# Patient Record
Sex: FEMALE | Race: BLACK OR AFRICAN AMERICAN | NOT HISPANIC OR LATINO | URBAN - METROPOLITAN AREA
[De-identification: names, ages, dates, MRNs, and addresses within clinical notes are randomized per-mention and may not be internally consistent; named-entity substitution may affect disease eponyms.]

---

## 2017-12-14 ENCOUNTER — EMERGENCY (EMERGENCY)
Facility: HOSPITAL | Age: 57
LOS: 1 days | Discharge: HOME | End: 2017-12-14
Attending: EMERGENCY MEDICINE
Payer: COMMERCIAL

## 2017-12-14 VITALS
OXYGEN SATURATION: 97 % | TEMPERATURE: 99 F | DIASTOLIC BLOOD PRESSURE: 70 MMHG | RESPIRATION RATE: 15 BRPM | HEART RATE: 72 BPM | SYSTOLIC BLOOD PRESSURE: 135 MMHG

## 2017-12-14 VITALS
DIASTOLIC BLOOD PRESSURE: 72 MMHG | OXYGEN SATURATION: 98 % | RESPIRATION RATE: 16 BRPM | HEIGHT: 60 IN | WEIGHT: 147.93 LBS | TEMPERATURE: 99 F | HEART RATE: 84 BPM | SYSTOLIC BLOOD PRESSURE: 145 MMHG

## 2017-12-14 LAB
ALBUMIN SERPL ELPH-MCNC: 4.3 G/DL — SIGNIFICANT CHANGE UP (ref 3.3–5.2)
ALP SERPL-CCNC: 71 U/L — SIGNIFICANT CHANGE UP (ref 40–120)
ALT FLD-CCNC: 32 U/L — SIGNIFICANT CHANGE UP
ANION GAP SERPL CALC-SCNC: 17 MMOL/L — SIGNIFICANT CHANGE UP (ref 5–17)
APTT BLD: 30.3 SEC — SIGNIFICANT CHANGE UP (ref 27.5–37.4)
AST SERPL-CCNC: 22 U/L — SIGNIFICANT CHANGE UP
BILIRUB SERPL-MCNC: 0.3 MG/DL — LOW (ref 0.4–2)
BUN SERPL-MCNC: 29 MG/DL — HIGH (ref 8–20)
CALCIUM SERPL-MCNC: 9.7 MG/DL — SIGNIFICANT CHANGE UP (ref 8.6–10.2)
CHLORIDE SERPL-SCNC: 97 MMOL/L — LOW (ref 98–107)
CO2 SERPL-SCNC: 24 MMOL/L — SIGNIFICANT CHANGE UP (ref 22–29)
CREAT SERPL-MCNC: 1.58 MG/DL — HIGH (ref 0.5–1.3)
GLUCOSE SERPL-MCNC: 263 MG/DL — HIGH (ref 70–115)
HCT VFR BLD CALC: 28.4 % — LOW (ref 37–47)
HGB BLD-MCNC: 9.3 G/DL — LOW (ref 12–16)
INR BLD: 0.99 RATIO — SIGNIFICANT CHANGE UP (ref 0.88–1.16)
MCHC RBC-ENTMCNC: 26.6 PG — LOW (ref 27–31)
MCHC RBC-ENTMCNC: 32.7 G/DL — SIGNIFICANT CHANGE UP (ref 32–36)
MCV RBC AUTO: 81.4 FL — SIGNIFICANT CHANGE UP (ref 81–99)
PLATELET # BLD AUTO: 282 K/UL — SIGNIFICANT CHANGE UP (ref 150–400)
POTASSIUM SERPL-MCNC: 3.6 MMOL/L — SIGNIFICANT CHANGE UP (ref 3.5–5.3)
POTASSIUM SERPL-SCNC: 3.6 MMOL/L — SIGNIFICANT CHANGE UP (ref 3.5–5.3)
PROT SERPL-MCNC: 8 G/DL — SIGNIFICANT CHANGE UP (ref 6.6–8.7)
PROTHROM AB SERPL-ACNC: 10.9 SEC — SIGNIFICANT CHANGE UP (ref 9.8–12.7)
RBC # BLD: 3.49 M/UL — LOW (ref 4.4–5.2)
RBC # FLD: 12.4 % — SIGNIFICANT CHANGE UP (ref 11–15.6)
SODIUM SERPL-SCNC: 138 MMOL/L — SIGNIFICANT CHANGE UP (ref 135–145)
WBC # BLD: 13 K/UL — HIGH (ref 4.8–10.8)
WBC # FLD AUTO: 13 K/UL — HIGH (ref 4.8–10.8)

## 2017-12-14 PROCEDURE — 71250 CT THORAX DX C-: CPT

## 2017-12-14 PROCEDURE — 85730 THROMBOPLASTIN TIME PARTIAL: CPT

## 2017-12-14 PROCEDURE — 99284 EMERGENCY DEPT VISIT MOD MDM: CPT | Mod: 25

## 2017-12-14 PROCEDURE — 85610 PROTHROMBIN TIME: CPT

## 2017-12-14 PROCEDURE — 71250 CT THORAX DX C-: CPT | Mod: 26

## 2017-12-14 PROCEDURE — 71010: CPT | Mod: 26

## 2017-12-14 PROCEDURE — 71045 X-RAY EXAM CHEST 1 VIEW: CPT

## 2017-12-14 PROCEDURE — 72125 CT NECK SPINE W/O DYE: CPT

## 2017-12-14 PROCEDURE — 80053 COMPREHEN METABOLIC PANEL: CPT

## 2017-12-14 PROCEDURE — 70450 CT HEAD/BRAIN W/O DYE: CPT

## 2017-12-14 PROCEDURE — 85027 COMPLETE CBC AUTOMATED: CPT

## 2017-12-14 PROCEDURE — 36415 COLL VENOUS BLD VENIPUNCTURE: CPT

## 2017-12-14 PROCEDURE — 99285 EMERGENCY DEPT VISIT HI MDM: CPT

## 2017-12-14 PROCEDURE — 70450 CT HEAD/BRAIN W/O DYE: CPT | Mod: 26

## 2017-12-14 PROCEDURE — 72125 CT NECK SPINE W/O DYE: CPT | Mod: 26

## 2017-12-14 RX ORDER — ONDANSETRON 8 MG/1
4 TABLET, FILM COATED ORAL ONCE
Qty: 0 | Refills: 0 | Status: COMPLETED | OUTPATIENT
Start: 2017-12-14 | End: 2017-12-14

## 2017-12-14 RX ORDER — ACETAMINOPHEN 500 MG
650 TABLET ORAL ONCE
Qty: 0 | Refills: 0 | Status: COMPLETED | OUTPATIENT
Start: 2017-12-14 | End: 2017-12-14

## 2017-12-14 RX ORDER — ONDANSETRON 8 MG/1
4 TABLET, FILM COATED ORAL ONCE
Qty: 0 | Refills: 0 | Status: DISCONTINUED | OUTPATIENT
Start: 2017-12-14 | End: 2017-12-14

## 2017-12-14 RX ADMIN — Medication 650 MILLIGRAM(S): at 15:00

## 2017-12-14 RX ADMIN — Medication 650 MILLIGRAM(S): at 13:55

## 2017-12-14 RX ADMIN — ONDANSETRON 4 MILLIGRAM(S): 8 TABLET, FILM COATED ORAL at 14:51

## 2017-12-14 NOTE — ED PROVIDER NOTE - PROGRESS NOTE DETAILS
pt results reviewed and she has known renal failure and anemia and she does not have any symptoms of active gall bladder disease and she will take all test results to her doctor for further outpt workup

## 2017-12-14 NOTE — ED ADULT NURSE NOTE - OBJECTIVE STATEMENT
PT came in c/o fall 2 days ago down steps at home, PT fell 2 days ago. PT denies LOC, PT c/o right sided rib pain, and generalized right sided soreness

## 2017-12-14 NOTE — ED PROVIDER NOTE - MUSCULOSKELETAL, MLM
Spine appears normal, range of motion is not limited, no muscle or joint tenderness TTP to right lateral ribs and posterior head.

## 2017-12-14 NOTE — ED PROVIDER NOTE - OBJECTIVE STATEMENT
58 y/o female with a PMHx of HTN, type 2 diabetes, HLD, kidney disease, fibroids, anemia was well until 3 days when she fell down the stairs in her home. Pt states that she hit the back of her head, her back, and her right side. Pt states that she did not experience LOC, but felt dizzy and had a HA post fall. Pt states she took 1 percocet 2 days ago and 1 percocet yesterday that she previously had prescribed for chronic back pain. Pt states that she began vomiting last night, and has not had a BM or flatus in 2 days. At this time, pt states that she feels nauseous and has pain in her right side. Pt denies HA, chest pain, SOB, cough, fever, diarrhea.

## 2017-12-14 NOTE — ED PROVIDER NOTE - CARE PLAN
Principal Discharge DX:	Multiple contusions of trunk, initial encounter  Secondary Diagnosis:	Anemia  Secondary Diagnosis:	Kidney disease

## 2017-12-14 NOTE — ED PROVIDER NOTE - MEDICAL DECISION MAKING DETAILS
58 y/o female s/p fall 3 days ago c/o right-sided rib pain and nausea. Will obtain CT head, CT C-spine, CT chest, CXR and re-eval.

## 2017-12-14 NOTE — ED ADULT TRIAGE NOTE - CHIEF COMPLAINT QUOTE
Pt BIBA, PARIS&Ox3 ambulatory in ED. Pt c/o trip and fall x3 days ago c/o right lower back pain, states " I feel like my whole body is weak." Denies LOC, head injury or blood thinners. Pt in no apparent distress, steady gait noted.

## 2019-01-20 ENCOUNTER — EMERGENCY (EMERGENCY)
Facility: HOSPITAL | Age: 59
LOS: 1 days | Discharge: HOME | End: 2019-01-20
Attending: EMERGENCY MEDICINE
Payer: MEDICARE

## 2019-01-20 VITALS
RESPIRATION RATE: 18 BRPM | HEIGHT: 60 IN | TEMPERATURE: 98 F | OXYGEN SATURATION: 97 % | DIASTOLIC BLOOD PRESSURE: 68 MMHG | SYSTOLIC BLOOD PRESSURE: 123 MMHG | HEART RATE: 79 BPM | WEIGHT: 138.01 LBS

## 2019-01-20 VITALS
HEART RATE: 71 BPM | OXYGEN SATURATION: 96 % | SYSTOLIC BLOOD PRESSURE: 119 MMHG | RESPIRATION RATE: 18 BRPM | TEMPERATURE: 98 F | DIASTOLIC BLOOD PRESSURE: 75 MMHG

## 2019-01-20 PROBLEM — N28.9 DISORDER OF KIDNEY AND URETER, UNSPECIFIED: Chronic | Status: ACTIVE | Noted: 2017-12-14

## 2019-01-20 PROBLEM — E11.9 TYPE 2 DIABETES MELLITUS WITHOUT COMPLICATIONS: Chronic | Status: ACTIVE | Noted: 2017-12-14

## 2019-01-20 PROBLEM — D64.9 ANEMIA, UNSPECIFIED: Chronic | Status: ACTIVE | Noted: 2017-12-14

## 2019-01-20 PROBLEM — D25.9 LEIOMYOMA OF UTERUS, UNSPECIFIED: Chronic | Status: ACTIVE | Noted: 2017-12-14

## 2019-01-20 PROBLEM — I10 ESSENTIAL (PRIMARY) HYPERTENSION: Chronic | Status: ACTIVE | Noted: 2017-12-14

## 2019-01-20 PROBLEM — E78.5 HYPERLIPIDEMIA, UNSPECIFIED: Chronic | Status: ACTIVE | Noted: 2017-12-14

## 2019-01-20 LAB
ALBUMIN SERPL ELPH-MCNC: 3.9 G/DL — SIGNIFICANT CHANGE UP (ref 3.3–5.2)
ALP SERPL-CCNC: 55 U/L — SIGNIFICANT CHANGE UP (ref 40–120)
ALT FLD-CCNC: 17 U/L — SIGNIFICANT CHANGE UP
ANION GAP SERPL CALC-SCNC: 14 MMOL/L — SIGNIFICANT CHANGE UP (ref 5–17)
APTT BLD: 33.6 SEC — SIGNIFICANT CHANGE UP (ref 27.5–36.3)
AST SERPL-CCNC: 16 U/L — SIGNIFICANT CHANGE UP
BASOPHILS # BLD AUTO: 0 K/UL — SIGNIFICANT CHANGE UP (ref 0–0.2)
BASOPHILS NFR BLD AUTO: 0.5 % — SIGNIFICANT CHANGE UP (ref 0–2)
BILIRUB SERPL-MCNC: 0.3 MG/DL — LOW (ref 0.4–2)
BUN SERPL-MCNC: 26 MG/DL — HIGH (ref 8–20)
CALCIUM SERPL-MCNC: 9.3 MG/DL — SIGNIFICANT CHANGE UP (ref 8.6–10.2)
CHLORIDE SERPL-SCNC: 100 MMOL/L — SIGNIFICANT CHANGE UP (ref 98–107)
CK SERPL-CCNC: 131 U/L — SIGNIFICANT CHANGE UP (ref 25–170)
CO2 SERPL-SCNC: 22 MMOL/L — SIGNIFICANT CHANGE UP (ref 22–29)
CREAT SERPL-MCNC: 1.86 MG/DL — HIGH (ref 0.5–1.3)
EOSINOPHIL # BLD AUTO: 0.2 K/UL — SIGNIFICANT CHANGE UP (ref 0–0.5)
EOSINOPHIL NFR BLD AUTO: 3.1 % — SIGNIFICANT CHANGE UP (ref 0–6)
GLUCOSE SERPL-MCNC: 204 MG/DL — HIGH (ref 70–115)
HCT VFR BLD CALC: 28.7 % — LOW (ref 37–47)
HGB BLD-MCNC: 9.2 G/DL — LOW (ref 12–16)
INR BLD: 0.96 RATIO — SIGNIFICANT CHANGE UP (ref 0.88–1.16)
LYMPHOCYTES # BLD AUTO: 2.6 K/UL — SIGNIFICANT CHANGE UP (ref 1–4.8)
LYMPHOCYTES # BLD AUTO: 34.7 % — SIGNIFICANT CHANGE UP (ref 20–55)
MCHC RBC-ENTMCNC: 27.2 PG — SIGNIFICANT CHANGE UP (ref 27–31)
MCHC RBC-ENTMCNC: 32.1 G/DL — SIGNIFICANT CHANGE UP (ref 32–36)
MCV RBC AUTO: 84.9 FL — SIGNIFICANT CHANGE UP (ref 81–99)
MONOCYTES # BLD AUTO: 0.5 K/UL — SIGNIFICANT CHANGE UP (ref 0–0.8)
MONOCYTES NFR BLD AUTO: 7.2 % — SIGNIFICANT CHANGE UP (ref 3–10)
NEUTROPHILS # BLD AUTO: 4 K/UL — SIGNIFICANT CHANGE UP (ref 1.8–8)
NEUTROPHILS NFR BLD AUTO: 54.2 % — SIGNIFICANT CHANGE UP (ref 37–73)
PLATELET # BLD AUTO: 306 K/UL — SIGNIFICANT CHANGE UP (ref 150–400)
POTASSIUM SERPL-MCNC: 4.7 MMOL/L — SIGNIFICANT CHANGE UP (ref 3.5–5.3)
POTASSIUM SERPL-SCNC: 4.7 MMOL/L — SIGNIFICANT CHANGE UP (ref 3.5–5.3)
PROT SERPL-MCNC: 7.3 G/DL — SIGNIFICANT CHANGE UP (ref 6.6–8.7)
PROTHROM AB SERPL-ACNC: 11 SEC — SIGNIFICANT CHANGE UP (ref 10–12.9)
RBC # BLD: 3.38 M/UL — LOW (ref 4.4–5.2)
RBC # FLD: 12.7 % — SIGNIFICANT CHANGE UP (ref 11–15.6)
SODIUM SERPL-SCNC: 136 MMOL/L — SIGNIFICANT CHANGE UP (ref 135–145)
TROPONIN T SERPL-MCNC: <0.01 NG/ML — SIGNIFICANT CHANGE UP (ref 0–0.06)
WBC # BLD: 7.5 K/UL — SIGNIFICANT CHANGE UP (ref 4.8–10.8)
WBC # FLD AUTO: 7.5 K/UL — SIGNIFICANT CHANGE UP (ref 4.8–10.8)

## 2019-01-20 PROCEDURE — 70450 CT HEAD/BRAIN W/O DYE: CPT

## 2019-01-20 PROCEDURE — 85730 THROMBOPLASTIN TIME PARTIAL: CPT

## 2019-01-20 PROCEDURE — 84484 ASSAY OF TROPONIN QUANT: CPT

## 2019-01-20 PROCEDURE — 99284 EMERGENCY DEPT VISIT MOD MDM: CPT | Mod: 25

## 2019-01-20 PROCEDURE — 99284 EMERGENCY DEPT VISIT MOD MDM: CPT

## 2019-01-20 PROCEDURE — 85610 PROTHROMBIN TIME: CPT

## 2019-01-20 PROCEDURE — 93005 ELECTROCARDIOGRAM TRACING: CPT

## 2019-01-20 PROCEDURE — 70450 CT HEAD/BRAIN W/O DYE: CPT | Mod: 26

## 2019-01-20 PROCEDURE — 85027 COMPLETE CBC AUTOMATED: CPT

## 2019-01-20 PROCEDURE — 80053 COMPREHEN METABOLIC PANEL: CPT

## 2019-01-20 PROCEDURE — 82550 ASSAY OF CK (CPK): CPT

## 2019-01-20 PROCEDURE — 36415 COLL VENOUS BLD VENIPUNCTURE: CPT

## 2019-01-20 PROCEDURE — 93010 ELECTROCARDIOGRAM REPORT: CPT

## 2019-01-20 RX ORDER — KETOROLAC TROMETHAMINE 30 MG/ML
15 SYRINGE (ML) INJECTION ONCE
Qty: 0 | Refills: 0 | Status: DISCONTINUED | OUTPATIENT
Start: 2019-01-20 | End: 2019-01-20

## 2019-01-20 RX ORDER — LIDOCAINE 4 G/100G
1 CREAM TOPICAL
Qty: 5 | Refills: 0 | OUTPATIENT
Start: 2019-01-20 | End: 2019-01-24

## 2019-01-20 RX ORDER — METHOCARBAMOL 500 MG/1
2 TABLET, FILM COATED ORAL
Qty: 32 | Refills: 0 | OUTPATIENT
Start: 2019-01-20 | End: 2019-01-23

## 2019-01-20 RX ORDER — LIDOCAINE 4 G/100G
1 CREAM TOPICAL ONCE
Qty: 0 | Refills: 0 | Status: DISCONTINUED | OUTPATIENT
Start: 2019-01-20 | End: 2019-01-25

## 2019-01-20 RX ORDER — METHOCARBAMOL 500 MG/1
1000 TABLET, FILM COATED ORAL ONCE
Qty: 0 | Refills: 0 | Status: COMPLETED | OUTPATIENT
Start: 2019-01-20 | End: 2019-01-20

## 2019-01-20 RX ORDER — ACETAMINOPHEN 500 MG
975 TABLET ORAL ONCE
Qty: 0 | Refills: 0 | Status: DISCONTINUED | OUTPATIENT
Start: 2019-01-20 | End: 2019-01-25

## 2019-01-20 RX ADMIN — METHOCARBAMOL 1000 MILLIGRAM(S): 500 TABLET, FILM COATED ORAL at 14:23

## 2019-01-20 NOTE — ED PROVIDER NOTE - PLAN OF CARE
1. Return to ED for worsening, progressive or any other concerning symptoms   2. Follow up with your primary care doctor in 2-3days   3. Take Tylenol up to 1000 mg every 6 hours as needed for pain.   4. Apply topical lidoderm patch to the area every 24hrs   5. Take 2-3 tablets of 500mg Robaxin every 6 hours for muscle spasm. May cause drowsiness do not drive or drink with these medications.   6. Follow up with physical therapy 2-3 x a week for 4 weeks   7. Follow up with orthopedic clinic [id number].   Open Thursday 1-9pm  Saturday 8-4pm   8. Follow up with [facility] [phone].

## 2019-01-20 NOTE — ED PROVIDER NOTE - PROGRESS NOTE DETAILS
patient remains asymptomatic, labs wnl, has CKD, pending head CT, patient does not want to wait, still with radicular pain will give tylenol/lidoderm patch. The patient is leaving against medical advice. The patient has the capacity to refuse further medical evaluation and care. I had a lengthy discussion with the patient in which appropriate further evaluation and treatment was offered to the patient as well as acceptable alternative measures, and they declined. The patient understands that as a consequence of this decision they may be at risk for clinical deterioration including stroke, heart attack, permanent disability and death. The patient understands and verbalized the risks of leaving without further evaulation and treatment. Clear return precautions were discussed. The patient was urged to seek follow-up care, with appropriate referrals made as needed. The patient was also given a prescription for robaxin and physical therapy. -Lefty DO patient decided to stay, CT head negative will give outpt Follow up -Lefty LLAMAS

## 2019-01-20 NOTE — ED PROVIDER NOTE - MEDICAL DECISION MAKING DETAILS
patient with radicular back pain, reproducible +straight leg raise, has weakness of left leg on exam due to pain. gait intact. no other neuro deficits. episode of dizziness with generalized weakness was not vertigo no change in gait then, more likely presyncope that has resolved and not recurred. will check basic labs, ekg, trop, ct head. muscle relax. tylenol and lidoderm patch. reasses will need outpt orthospine, PT, acupuncture

## 2019-01-20 NOTE — ED PROVIDER NOTE - NS ED ROS FT
ROS: no CP/SOB. no cough. no fever. no n/v/d/c. no abd pain. no rash. no bleeding. no urinary complaints. +weakness. no vision changes. no HA. +back pain. no extremity swelling/deformity. No change in mental status.

## 2019-01-20 NOTE — ED STATDOCS - PROGRESS NOTE DETAILS
This patient is a 60 y/o woman with hx of anemia, HTN, DM, and HLD who resents to ED c/o dizziness and equilibrium unbalance, with sciatic pain. Pt is concerned she may have had a minor stroke. Pt states on 12/31, she was in a laundromat and felt suddenly weakness, dizziness and confusion.  She developed left leg pain the following day but what has concerned her is the worsening feeling of disequalibrium.  Per son denies slurred speech, and pt being unable to do ADL. Denies fever. No further complaints at this time.  PCP: Usman Cowan  PE: Muscl: Positive straight leg raise. GCS15.  Send to MAIN for TIA workup, may possibly evolve to being placed on OBS status.

## 2019-01-20 NOTE — ED PROVIDER NOTE - CARE PLAN
Principal Discharge DX:	Sciatica of left side  Assessment and plan of treatment:	1. Return to ED for worsening, progressive or any other concerning symptoms   2. Follow up with your primary care doctor in 2-3days   3. Take Tylenol up to 1000 mg every 6 hours as needed for pain.   4. Apply topical lidoderm patch to the area every 24hrs   5. Take 2-3 tablets of 500mg Robaxin every 6 hours for muscle spasm. May cause drowsiness do not drive or drink with these medications.   6. Follow up with physical therapy 2-3 x a week for 4 weeks   7. Follow up with orthopedic clinic [phone].   Open Thursday 1-9pm  Saturday 8-4pm   8. Follow up with [facility] [phone].  Secondary Diagnosis:	Pre-syncope

## 2019-01-20 NOTE — ED ADULT NURSE NOTE - OBJECTIVE STATEMENT
Pt awake, alert and oriented x3 c/o left leg pain.  Pt states "she knows it is sciatica but it has been hurting her since 12/31 and the pain is getting worse and she has no meds at home".  Pt states she is also concerned because weeks ago she had an epsiode of generalized weakness when folding clothes at the [employer]und[employer].  Pt denies focal weakness or deficit.  Pts only complaint at this time is her left leg pain.  Denies chest pain or shortness of breath.  Moving all extremities well and with purpose.

## 2019-01-20 NOTE — ED ADULT TRIAGE NOTE - CHIEF COMPLAINT QUOTE
2 weeks ago had an episode of feeling weak at a laundromat, then had an episode of blurred vision a couple days after, states now has a metallic taste in mouth, wants to be checked for possible TIA, states currently has pain to left leg back of thigh and some blurred vision.  Last known well 2 weeks ago.

## 2019-01-20 NOTE — ED PROVIDER NOTE - OBJECTIVE STATEMENT
58yo F with worsening leg pain and weakness since end of December. on 12/31 was doing laundry felt dizzy, pre-syncopal, weak all over and flushed, sat down sx went away. similar time developed left sided buttock/back pain radiating down left leg, worse with movement. tried OTC tylenol without relief has been taking percocet which help, ran out this week now pain worsening, +tingling in left foot, feels like leg is weak due to pain. no falls. +limp. no arm weakness. no facial asymmetry. no slurred speech. no CP/SOB. no HA. no h/o cardiac disease, stress/echo years ago. no recurrence of 'dizziness' since episode. here due to pain and gait concern she had a stroke

## 2019-01-20 NOTE — ED ADULT NURSE REASSESSMENT NOTE - NS ED NURSE REASSESS COMMENT FT1
Pt left prior to receiving discharge papers from RN.  Upon entering room, witness in room states she saw pt remove IV access.  Call placed to pt to confirm.  Pt states "I took out the IV and put it in the needles bin".  CRAIG Coppola aware.

## 2019-01-20 NOTE — ED STATDOCS - OBJECTIVE STATEMENT
58 y/o F pt with hx of anemia, HTN, DM, and HLD presents to ED c/o dizziness and equilibrium unbalance, with sciatic pain. Pt is also concerned she may have had a minor stroke. Pt states on 12/31, she was in a laundromat and felt suddenly weak/confused. She admits that before her episode of confusion, she's felt her balance/equilibrium off, and now feels more off balance. Pt developed sciatic pain after her episode. Per son denies slurred speech, and pt being unable to do ADL. Denies fever. No further complaints at this time.  PCP: Usman Cowan  PE: Muscl: Positive straight leg raise. GCS15.  Send to MAIN for TIA workup, may possibly evolve to being placed on OBS status.

## 2019-01-20 NOTE — ED PROVIDER NOTE - PHYSICAL EXAMINATION
Gen: NAD, AOx3  Head: NCAT  HEENT: PERRL, EOMI, oral mucosa moist, normal conjunctiva, neck supple  Lung: CTAB, no respiratory distress  CV: rrr, no murmur, Normal perfusion  Abd: soft, NTND  MSK: No edema, no visible deformities, no midline spinal ttp, +ttp left buttock/piriformis  Neuro: No focal neurologic deficits, CN II-XII intact, 5/5 strength b/l UE and Lt LE, 5/5 Rt hip flexion 4/5 left knee flexion/extension/dorsiflexion due to pain, sensation intact, no dysmetria/ataxia, gait intact   Skin: No rash   Psych: normal affect